# Patient Record
Sex: FEMALE | Race: WHITE
[De-identification: names, ages, dates, MRNs, and addresses within clinical notes are randomized per-mention and may not be internally consistent; named-entity substitution may affect disease eponyms.]

---

## 2017-06-03 ENCOUNTER — HEALTH MAINTENANCE LETTER (OUTPATIENT)
Age: 27
End: 2017-06-03

## 2017-11-01 ENCOUNTER — OFFICE VISIT (OUTPATIENT)
Dept: FAMILY MEDICINE | Facility: OTHER | Age: 27
End: 2017-11-01
Payer: COMMERCIAL

## 2017-11-01 VITALS
SYSTOLIC BLOOD PRESSURE: 102 MMHG | OXYGEN SATURATION: 100 % | RESPIRATION RATE: 16 BRPM | BODY MASS INDEX: 24.46 KG/M2 | HEART RATE: 74 BPM | TEMPERATURE: 98 F | WEIGHT: 147 LBS | DIASTOLIC BLOOD PRESSURE: 68 MMHG

## 2017-11-01 DIAGNOSIS — J02.9 VIRAL PHARYNGITIS: Primary | ICD-10-CM

## 2017-11-01 DIAGNOSIS — R07.0 THROAT PAIN: ICD-10-CM

## 2017-11-01 LAB
DEPRECATED S PYO AG THROAT QL EIA: NORMAL
SPECIMEN SOURCE: NORMAL

## 2017-11-01 PROCEDURE — 87081 CULTURE SCREEN ONLY: CPT | Performed by: NURSE PRACTITIONER

## 2017-11-01 PROCEDURE — 99213 OFFICE O/P EST LOW 20 MIN: CPT | Performed by: NURSE PRACTITIONER

## 2017-11-01 PROCEDURE — 87880 STREP A ASSAY W/OPTIC: CPT | Performed by: NURSE PRACTITIONER

## 2017-11-01 NOTE — MR AVS SNAPSHOT
After Visit Summary   11/1/2017    Heidy Callejas    MRN: [id number]           Patient Information     Date Of Birth          1990        Visit Information        Provider Department      11/1/2017 9:20 AM Esther Collins APRN [facility]        Today's Diagnoses     Throat pain    -  1      Care Instructions    Your strep test was negative today, a culture is pending.  This takes 2 days and they only call on positive results.  We would then call in antibiotics for you if needed.     Take Ibuprofen or Tylenol as needed for pain/sore throat/fevers.     This should improve within the next week or so.  If not, let me know.     Follow up if symptoms fail to resolve as expected.     Viral Pharyngitis (Sore Throat)  You (or your child, if your child is the patient) have pharyngitis (sore throat). This infection is caused by a virus. It can cause throat pain that is worse when swallowing, aching all over, headache, and fever. The infection may be spread by coughing, kissing, or touching others after touching your mouth or nose. Antibiotic medications do not work against viruses, so they are not used for treating this condition.  Home care    If your symptoms are severe, rest at home. Return to work or school when you feel well enough.     Drink plenty of fluids to avoid dehydration.    For children: Use acetaminophen for fever, fussiness or discomfort. In infants over six months of age, you may use ibuprofen instead of acetaminophen. (NOTE: If your child has chronic liver or kidney disease or ever had a stomach ulcer or GI bleeding, talk with your doctor before using these medicines.) (NOTE: Aspirin should never be used in anyone under 18 years of age who is ill with a fever. It may cause severe liver damage.)     For adults: You may use acetaminophen or ibuprofen to control pain or fever, unless another medicine was prescribed for this. (NOTE: If you have chronic liver or kidney  disease or ever had a stomach ulcer or GI bleeding, talk with your doctor before using these medicines.)    Throat lozenges or numbing throat sprays can help reduce pain. Gargling with warm salt water will also help reduce throat pain. For this, dissolve 1/2 teaspoon of salt in 1 glass of warm water. To help soothe a sore throat, children can sip on juice or a popsicle. Children 5 years and older can also suck on a lollipop or hard candy.    Avoid salty or spicy foods, which can be irritating to the throat.  Follow-up care  Follow up with your healthcare provider or our staff if you are not improving over the next week.  When to seek medical advice  Call your healthcare provider right away if any of these occur:    Fever as directed by your doctor.  For children, seek care if:    Your child is of any age and has repeated fevers above 104 F (40 C).    Your child is younger than 2 years of age and has a fever of 100.4 F (38 C) that continues for more than 1 day.    Your child is 2 years old or older and has a fever of 100.4 F (38 C) that continues for more than 3 days.    New or worsening ear pain, sinus pain, or headache    Painful lumps in the back of neck    Stiff neck    Lymph nodes are getting larger    Inability to swallow liquids, excessive drooling, or inability to open mouth wide due to throat pain    Signs of dehydration (very dark urine or no urine, sunken eyes, dizziness)    Trouble breathing or noisy breathing    Muffled voice    New rash    Child appears to be getting sicker  Date Last Reviewed: 4/13/2015 2000-2017 [employer]. [address] [place] [address], [place] [address]. All rights reserved. This information is not intended as a substitute for professional medical care. Always follow your healthcare professional's instructions.                Follow-ups after your visit        Who to contact     If you have questions or need follow up information about today's clinic visit or your  schedule please contact [facility] directly at [phone].  Normal or non-critical lab and imaging results will be communicated to you by MyChart, letter or phone within 4 business days after the clinic has received the results. If you do not hear from us within 7 days, please contact the clinic through [employer] or phone. If you have a critical or abnormal lab result, we will notify you by phone as soon as possible.  Submit refill requests through [employer] or call your pharmacy and they will forward the refill request to us. Please allow 3 business days for your refill to be completed.          Additional Information About Your Visit        [employer] Information     [employer] gives you secure access to your electronic health record. If you see a primary care provider, you can also send messages to your care team and make appointments. If you have questions, please call your primary care clinic.  If you do not have a primary care provider, please call [phone] and they will assist you.        Care EveryWhere ID     This is your Care EveryWhere ID. This could be used by other organizations to access your [facility] medical records  [id number]        Your Vitals Were     Pulse Temperature Respirations Last Period Pulse Oximetry Breastfeeding?    74 98  F (36.7  C) (Tympanic) 16 10/25/2017 (Approximate) 100% No    BMI (Body Mass Index)                   24.46 kg/m2            Blood Pressure from Last 3 Encounters:   11/01/17 102/68   02/12/15 100/64   11/18/13 116/71    Weight from Last 3 Encounters:   11/01/17 147 lb (66.7 kg)   02/12/15 132 lb (59.9 kg)   07/19/13 141 lb 8 oz (64.2 kg)              We Performed the Following     Beta strep group A culture     Strep, Rapid Screen        Primary Care Provider Office Phone # Fax #    Julio César Alfaro [phone]       [address] [facility] [address]        Equal Access to Services     GEORGIANA VILLEGAS: mary Long  rené ramirezmaольга bashirnghiaольга say tutuin hayaan adeeg kharash la'aan ah. So [facility] [phone].    ATENCIÓN: Si cornell renner, tiene a wyman disposición servicios gratuitos de asistencia lingüística. Llame al [phone].    We comply with applicable federal civil rights laws and [place] laws. We do not discriminate on the basis of race, color, national origin, age, disability, sex, sexual orientation, or gender identity.            Thank you!     Thank you for choosing [facility]  for your care. Our goal is always to provide you with excellent care. Hearing back from our patients is one way we can continue to improve our services. Please take a few minutes to complete the written survey that you may receive in the mail after your visit with us. Thank you!             Your Updated Medication List - Protect others around you: Learn how to safely use, store and throw away your medicines at www.disposemymeds.org.          This list is accurate as of: 11/1/17  9:57 AM.  Always use your most recent med list.                   Brand Name Dispense Instructions for use Diagnosis    sertraline 50 MG tablet    ZOLOFT    30 tablet    Take 1 tablet (50 mg) by mouth daily

## 2017-11-01 NOTE — NURSING NOTE
"Chief Complaint   Patient presents with     Throat Pain     x2 days     Ear Problem       Initial /68  Pulse 74  Temp 98  F (36.7  C) (Tympanic)  Resp 16  Wt 147 lb (66.7 kg)  LMP 10/25/2017 (Approximate)  SpO2 100%  Breastfeeding? No  BMI 24.46 kg/m2 Estimated body mass index is 24.46 kg/(m^2) as calculated from the following:    Height as of 7/19/13: 5' 5\" (1.651 m).    Weight as of this encounter: 147 lb (66.7 kg).  Medication Reconciliation: complete   ................Troy Dempsey LPN,   November 1, 2017,      9:34 AM,   [facility]    "

## 2017-11-01 NOTE — PATIENT INSTRUCTIONS
Your strep test was negative today, a culture is pending.  This takes 2 days and they only call on positive results.  We would then call in antibiotics for you if needed.     Take Ibuprofen or Tylenol as needed for pain/sore throat/fevers.     This should improve within the next week or so.  If not, let me know.     Follow up if symptoms fail to resolve as expected.     Viral Pharyngitis (Sore Throat)  You (or your child, if your child is the patient) have pharyngitis (sore throat). This infection is caused by a virus. It can cause throat pain that is worse when swallowing, aching all over, headache, and fever. The infection may be spread by coughing, kissing, or touching others after touching your mouth or nose. Antibiotic medications do not work against viruses, so they are not used for treating this condition.  Home care    If your symptoms are severe, rest at home. Return to work or school when you feel well enough.     Drink plenty of fluids to avoid dehydration.    For children: Use acetaminophen for fever, fussiness or discomfort. In infants over six months of age, you may use ibuprofen instead of acetaminophen. (NOTE: If your child has chronic liver or kidney disease or ever had a stomach ulcer or GI bleeding, talk with your doctor before using these medicines.) (NOTE: Aspirin should never be used in anyone under 18 years of age who is ill with a fever. It may cause severe liver damage.)     For adults: You may use acetaminophen or ibuprofen to control pain or fever, unless another medicine was prescribed for this. (NOTE: If you have chronic liver or kidney disease or ever had a stomach ulcer or GI bleeding, talk with your doctor before using these medicines.)    Throat lozenges or numbing throat sprays can help reduce pain. Gargling with warm salt water will also help reduce throat pain. For this, dissolve 1/2 teaspoon of salt in 1 glass of warm water. To help soothe a sore throat, children can sip on juice  or a popsicle. Children 5 years and older can also suck on a lollipop or hard candy.    Avoid salty or spicy foods, which can be irritating to the throat.  Follow-up care  Follow up with your healthcare provider or our staff if you are not improving over the next week.  When to seek medical advice  Call your healthcare provider right away if any of these occur:    Fever as directed by your doctor.  For children, seek care if:    Your child is of any age and has repeated fevers above 104 F (40 C).    Your child is younger than 2 years of age and has a fever of 100.4 F (38 C) that continues for more than 1 day.    Your child is 2 years old or older and has a fever of 100.4 F (38 C) that continues for more than 3 days.    New or worsening ear pain, sinus pain, or headache    Painful lumps in the back of neck    Stiff neck    Lymph nodes are getting larger    Inability to swallow liquids, excessive drooling, or inability to open mouth wide due to throat pain    Signs of dehydration (very dark urine or no urine, sunken eyes, dizziness)    Trouble breathing or noisy breathing    Muffled voice    New rash    Child appears to be getting sicker  Date Last Reviewed: 4/13/2015 2000-2017 [employer]. [address] [place] [address], [place] [address]. All rights reserved. This information is not intended as a substitute for professional medical care. Always follow your healthcare professional's instructions.

## 2017-11-01 NOTE — PROGRESS NOTES
SUBJECTIVE:   Heidy Callejas is a 27 year old female who presents to clinic today for the following health issues:      RESPIRATORY SYMPTOMS      Duration: 2 days    Description  sore throat and ear pain bilateral    Severity: moderate    Accompanying signs and symptoms: None    History (predisposing factors):  none    Precipitating or alleviating factors: None    Therapies tried and outcome:  nsaids      Problem list and histories reviewed & adjusted, as indicated.  Additional history: none    Patient Active Problem List   Diagnosis     Lack of menses     Past Surgical History:   Procedure Laterality Date     TONSILLECTOMY  7/10/2006       Social History   Substance Use Topics     Smoking status: Never Smoker     Smokeless tobacco: Never Used     Alcohol use No     No family history on file.      Current Outpatient Prescriptions   Medication Sig Dispense Refill     sertraline (ZOLOFT) 50 MG tablet Take 1 tablet (50 mg) by mouth daily 30 tablet 1     Allergies   Allergen Reactions     No Known Drug Allergies      BP Readings from Last 3 Encounters:   11/01/17 102/68   02/12/15 100/64   11/18/13 116/71    Wt Readings from Last 3 Encounters:   11/01/17 147 lb (66.7 kg)   02/12/15 132 lb (59.9 kg)   07/19/13 141 lb 8 oz (64.2 kg)                Reviewed and updated as needed this visit by clinical staffTobacco  Allergies  Meds  Soc Hx      Reviewed and updated as needed this visit by Provider         ROS:  CONSTITUTIONAL:POSITIVE  for fatigue and myalgias and NEGATIVE  for fever   ENT/MOUTH: POSITIVE for ear pain bilateral and sore throat and NEGATIVE for fever, nasal congestion and sinus pressure  R: NEGATIVE for significant cough or SOB  CV: NEGATIVE for chest pain, palpitations or peripheral edema    OBJECTIVE:     /68  Pulse 74  Temp 98  F (36.7  C) (Tympanic)  Resp 16  Wt 147 lb (66.7 kg)  LMP 10/25/2017 (Approximate)  SpO2 100%  Breastfeeding? No  BMI 24.46 kg/m2  Body mass index is 24.46  kg/(m^2).  GENERAL: healthy, alert and no distress  HENT: normal cephalic/atraumatic, right ear: clear effusion, left ear: normal: no effusions, no erythema, normal landmarks, nose and mouth without ulcers or lesions, oral mucous membranes moist, tonsillar erythema and sinuses: not tender  NECK: bilateral posterior cervical adenopathy, no asymmetry, masses, or scars and thyroid normal to palpation  RESP: lungs clear to auscultation - no rales, rhonchi or wheezes  CV: regular rate and rhythm, normal S1 S2, no S3 or S4, no murmur, click or rub, no peripheral edema and peripheral pulses strong  MS: no gross musculoskeletal defects noted, no edema    Diagnostic Test Results:  Strep screen - Negative    ASSESSMENT/PLAN:         1. Viral pharyngitis  Advised on symptomatic treatments including Tylenol, Ibuprofen, increasing fluids and rest.  Discussed testing for mono, but since it would not change our treatment plan, she declined.      2. Throat pain  - Strep, Rapid Screen  - Beta strep group A culture    FUTURE APPOINTMENTS:       - Follow up if symptoms fail to resolve as expected.   See Patient Instructions    DAVID Tierney [facility]

## 2017-11-02 LAB
BACTERIA SPEC CULT: NORMAL
SPECIMEN SOURCE: NORMAL

## 2019-12-09 ENCOUNTER — HEALTH MAINTENANCE LETTER (OUTPATIENT)
Age: 29
End: 2019-12-09

## 2020-03-15 ENCOUNTER — HEALTH MAINTENANCE LETTER (OUTPATIENT)
Age: 30
End: 2020-03-15

## 2021-01-14 ENCOUNTER — HEALTH MAINTENANCE LETTER (OUTPATIENT)
Age: 31
End: 2021-01-14

## 2021-10-24 ENCOUNTER — HEALTH MAINTENANCE LETTER (OUTPATIENT)
Age: 31
End: 2021-10-24

## 2022-02-13 ENCOUNTER — HEALTH MAINTENANCE LETTER (OUTPATIENT)
Age: 32
End: 2022-02-13

## 2022-10-15 ENCOUNTER — HEALTH MAINTENANCE LETTER (OUTPATIENT)
Age: 32
End: 2022-10-15

## 2023-03-26 ENCOUNTER — HEALTH MAINTENANCE LETTER (OUTPATIENT)
Age: 33
End: 2023-03-26